# Patient Record
Sex: FEMALE | Race: WHITE | NOT HISPANIC OR LATINO | Employment: FULL TIME | URBAN - METROPOLITAN AREA
[De-identification: names, ages, dates, MRNs, and addresses within clinical notes are randomized per-mention and may not be internally consistent; named-entity substitution may affect disease eponyms.]

---
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
## 2018-02-28 ENCOUNTER — LAB (OUTPATIENT)
Dept: LAB | Facility: HOSPITAL | Age: 18
End: 2018-02-28

## 2018-02-28 ENCOUNTER — TRANSCRIBE ORDERS (OUTPATIENT)
Dept: LAB | Facility: HOSPITAL | Age: 18
End: 2018-02-28

## 2018-02-28 DIAGNOSIS — R53.83 TIRED: ICD-10-CM

## 2018-02-28 DIAGNOSIS — R53.83 TIRED: Primary | ICD-10-CM

## 2018-02-28 LAB — 25(OH)D3 SERPL-MCNC: 24.4 NG/ML

## 2018-02-28 PROCEDURE — 86664 EPSTEIN-BARR NUCLEAR ANTIGEN: CPT

## 2018-02-28 PROCEDURE — 82306 VITAMIN D 25 HYDROXY: CPT

## 2018-02-28 PROCEDURE — 86663 EPSTEIN-BARR ANTIBODY: CPT

## 2018-02-28 PROCEDURE — 36415 COLL VENOUS BLD VENIPUNCTURE: CPT

## 2018-02-28 PROCEDURE — 86665 EPSTEIN-BARR CAPSID VCA: CPT

## 2018-03-01 LAB
EBV EA IGG SER-ACNC: 10.1 U/ML (ref 0–8.9)
EBV NA IGG SER IA-ACNC: 264 U/ML (ref 0–17.9)
EBV VCA IGG SER-ACNC: 174 U/ML (ref 0–17.9)
EBV VCA IGM SER-ACNC: <36 U/ML (ref 0–35.9)
INTERPRETATION: ABNORMAL

## 2018-03-26 ENCOUNTER — TRANSCRIBE ORDERS (OUTPATIENT)
Dept: ADMINISTRATIVE | Facility: HOSPITAL | Age: 18
End: 2018-03-26

## 2018-03-26 ENCOUNTER — HOSPITAL ENCOUNTER (OUTPATIENT)
Dept: GENERAL RADIOLOGY | Facility: HOSPITAL | Age: 18
Discharge: HOME | End: 2018-03-26
Attending: PEDIATRICS | Admitting: PEDIATRICS

## 2018-03-26 DIAGNOSIS — R07.9 CHEST PAIN, UNSPECIFIED TYPE: Primary | ICD-10-CM

## 2018-03-26 PROCEDURE — 71046 X-RAY EXAM CHEST 2 VIEWS: CPT

## 2018-03-28 ENCOUNTER — TRANSCRIBE ORDERS (OUTPATIENT)
Dept: ADMINISTRATIVE | Facility: HOSPITAL | Age: 18
End: 2018-03-28

## 2018-03-28 ENCOUNTER — HOSPITAL ENCOUNTER (OUTPATIENT)
Dept: CT IMAGING | Facility: HOSPITAL | Age: 18
Discharge: HOME | End: 2018-03-28
Admitting: NURSE PRACTITIONER

## 2018-03-28 DIAGNOSIS — R05.9 COUGH: ICD-10-CM

## 2018-03-28 DIAGNOSIS — R07.9 CHEST PAIN, UNSPECIFIED TYPE: ICD-10-CM

## 2018-03-28 DIAGNOSIS — R53.83 FATIGUE, UNSPECIFIED TYPE: ICD-10-CM

## 2018-03-28 DIAGNOSIS — R07.9 CHEST PAIN, UNSPECIFIED TYPE: Primary | ICD-10-CM

## 2018-03-28 PROCEDURE — 71260 CT THORAX DX C+: CPT

## 2018-03-28 PROCEDURE — 25010000002 IOPAMIDOL 61 % SOLUTION: Performed by: NURSE PRACTITIONER

## 2018-03-28 RX ADMIN — IOPAMIDOL 75 ML: 612 INJECTION, SOLUTION INTRAVENOUS at 14:56

## 2018-03-29 ENCOUNTER — TRANSCRIBE ORDERS (OUTPATIENT)
Dept: LAB | Facility: HOSPITAL | Age: 18
End: 2018-03-29

## 2018-03-29 ENCOUNTER — LAB (OUTPATIENT)
Dept: LAB | Facility: HOSPITAL | Age: 18
End: 2018-03-29

## 2018-03-29 DIAGNOSIS — R07.81 PLEURITIC CHEST PAIN: ICD-10-CM

## 2018-03-29 DIAGNOSIS — R06.02 SHORTNESS OF BREATH: ICD-10-CM

## 2018-03-29 DIAGNOSIS — R05.9 COUGH: ICD-10-CM

## 2018-03-29 DIAGNOSIS — J18.9 PNEUMONIA, ORGANISM UNSPECIFIED(486): ICD-10-CM

## 2018-03-29 DIAGNOSIS — R91.8 LUNG MASS: ICD-10-CM

## 2018-03-29 DIAGNOSIS — R91.8 LUNG MASS: Primary | ICD-10-CM

## 2018-03-29 LAB
CRP SERPL-MCNC: 0.63 MG/DL (ref 0–1)
CRYPTOC AG CSF QL: NEGATIVE
ERYTHROCYTE [SEDIMENTATION RATE] IN BLOOD: 32 MM/HR (ref 0–20)

## 2018-03-29 PROCEDURE — 85652 RBC SED RATE AUTOMATED: CPT

## 2018-03-29 PROCEDURE — 36415 COLL VENOUS BLD VENIPUNCTURE: CPT

## 2018-03-29 PROCEDURE — 86140 C-REACTIVE PROTEIN: CPT

## 2018-03-29 PROCEDURE — 87899 AGENT NOS ASSAY W/OPTIC: CPT

## 2018-04-12 ENCOUNTER — TRANSCRIBE ORDERS (OUTPATIENT)
Dept: ADMINISTRATIVE | Facility: HOSPITAL | Age: 18
End: 2018-04-12

## 2018-04-12 DIAGNOSIS — J18.9 PNEUMONIA, ORGANISM UNSPECIFIED(486): Primary | ICD-10-CM

## 2018-05-09 ENCOUNTER — HOSPITAL ENCOUNTER (OUTPATIENT)
Dept: GENERAL RADIOLOGY | Facility: HOSPITAL | Age: 18
Discharge: HOME | End: 2018-05-09
Admitting: NURSE PRACTITIONER

## 2018-05-09 ENCOUNTER — TRANSCRIBE ORDERS (OUTPATIENT)
Dept: ADMINISTRATIVE | Facility: HOSPITAL | Age: 18
End: 2018-05-09

## 2018-05-09 DIAGNOSIS — R05.9 COUGH: Primary | ICD-10-CM

## 2018-05-09 DIAGNOSIS — R50.9 FEVER, UNSPECIFIED FEVER CAUSE: ICD-10-CM

## 2018-05-09 PROCEDURE — 71046 X-RAY EXAM CHEST 2 VIEWS: CPT

## 2018-07-09 ENCOUNTER — OFFICE VISIT (OUTPATIENT)
Dept: ORTHOPEDIC SURGERY | Facility: CLINIC | Age: 18
End: 2018-07-09

## 2018-07-09 VITALS — OXYGEN SATURATION: 98 % | WEIGHT: 126.98 LBS | HEIGHT: 68 IN | HEART RATE: 100 BPM | BODY MASS INDEX: 19.25 KG/M2

## 2018-07-09 DIAGNOSIS — M79.672 LEFT FOOT PAIN: Primary | ICD-10-CM

## 2018-07-09 DIAGNOSIS — S90.32XA CONTUSION OF LEFT FOOT, INITIAL ENCOUNTER: ICD-10-CM

## 2018-07-09 PROCEDURE — 99204 OFFICE O/P NEW MOD 45 MIN: CPT | Performed by: PHYSICIAN ASSISTANT

## 2018-07-09 NOTE — PROGRESS NOTES
[facility] [place] [facility] [place]    Subjective     Chief Complaint   Patient presents with   • Left Foot - Pain, Edema        HPI      Kym Lo is a 17 y.o. female.  Patient presents to orthopedic clinic for evaluation of her left foot.  Stated 1 week ago she hit lateral aspect of the foot with stage.  Since then she's had complained of pain, swelling, bruising.  No prior history of injury or trauma to this foot.  She denies any distal paresthesias or radiculopathy.  Treatment today has included anti-inflammatories.  Current pain scale maximum 7/10.  Severity the pain moderate.  Quality pain aching and throbbing.  Associated symptoms swelling.  Symptoms worse with walking.        History reviewed. No pertinent past medical history.   History reviewed. No pertinent surgical history.   History reviewed. No pertinent family history.  Social History     Social History   • Marital status: Single     Spouse name: N/A   • Number of children: N/A   • Years of education: N/A     Occupational History   • Not on file.     Social History Main Topics   • Smoking status: Never Smoker   • Smokeless tobacco: Never Used   • Alcohol use No   • Drug use: No   • Sexual activity: Defer     Other Topics Concern   • Not on file     Social History Narrative   • No narrative on file      No current outpatient prescriptions on file prior to visit.     No current facility-administered medications on file prior to visit.       No Known Allergies     The following portions of the patient's history were reviewed and updated as appropriate: allergies, current medications, past family history, past medical history, past social history, past surgical history and problem list.    Review of Systems   Constitutional: Negative.    HENT: Negative.    Eyes: Negative.    Respiratory: Negative.    Cardiovascular: Negative.    Gastrointestinal: Negative.    Endocrine: Negative.    Genitourinary: Negative.    Musculoskeletal: Positive for  "arthralgias, gait problem and joint swelling.   Skin: Negative.    Allergic/Immunologic: Negative.    Hematological: Negative.    Psychiatric/Behavioral: Negative.         Objective      Physical Exam  Pulse 100   Ht 172.7 cm (68\")   Wt 57.6 kg (126 lb 15.8 oz)   SpO2 98%   BMI 19.31 kg/m²     Body mass index is 19.31 kg/m².        GENERAL APPEARANCE: awake, alert & oriented x 3, in no acute distress and well developed, well nourished  PSYCH: normal mood and affect  LUNGS:  breathing nonlabored, no wheezing  EYES: sclera anicteric, pupils equal  CARDIOVASCULAR: palpable pulses dorsalis pedis, palpable posterior tibial bilaterally. Capillary refill less than 2 seconds  INTEGUMENTARY: skin intact, no clubbing, cyanosis  NEUROLOGIC:  Normal Sensation and reflexes         Ortho Exam  Peripheral Vascular:  Lower Extremity:  Inspection:  Left--rapid capillary refill  Palpation:  Dorsalis pedis pulse:  Left--normal    Neurologic  Sensory:    Light Touch:     Left foot:  Dorsal intact and plantar intact    Overall Assessment of Muscle Strength and Tone:  Lower Extremities:       Left:  Tibialis anterior--5/5    Gastroc soleus--5/5    EHL--5/5    FHL--5/5    Musculoskeletal  Lower Extremity  Ankle/Foot:  Inspection and Palpation:        Left:  Tenderness: Lateral aspect foot to include fifth metatarsal.    Swelling: Localized swelling lateral aspect of the foot along with ecchymosis noted into the toes, bilateral foot and directly over fifth metatarsal.    Effusion:  None    Crepitus:  None     ROM:     Left: Plantarflexion--50    Dorsiflexion--20    Inversion--5    Eversion--5    Instability:        Left: Anterior drawer test--negative    Squeeze test--negative    Imaging/Studies  Imaging Results (last 7 days)     Procedure Component Value Units Date/Time    XR Foot 3+ View Left [[id number]] Resulted:  07/09/18 1503     Updated:  07/09/18 1504    Narrative:       Left Foot X-Ray  Indication: Pain  AP, Lateral, and Oblique " views    Findings:  No fracture  No bony lesion  Normal soft tissues  Normal joint spaces    No prior studies were available for comparison.          Ordered plain views of the left foot yesterday.  Images were reviewed by Dr. Sherwood.  No acute bony abnormality, fracture or dislocation.  Joint spaces are preserved.  No abnormal soft tissue findings.  See chart for official report.    Assessment/Plan        ICD-10-CM ICD-9-CM   1. Left foot pain M79.672 729.5   2. Contusion of left foot, initial encounter S90.32XA 924.20       Orders Placed This Encounter   Procedures   • XR Foot 3+ View Left        -Discussion was had with the patient regarding exam, imaging, assessment and treatment options.  -At this time we'll place her in a hard sole postop shoe to give her foot support.  -Recommend use of over-the-counter anti-inflammatories to help assist with pain and inflammation control.  -Discussed icing and elevation.  -To avoid any type of running jumping or high impact activities.  -Follow-up in 2 weeks for repeat evaluation to include repeat imaging if continues to be symptomatic.  -Questions and concerns answered.    Medical Decision Making  Management Options : over-the-counter medicine  Data/Risk: radiology tests    Diane Mancuso PA-C  07/10/18  10:03 AM         EMR Dragon/Transcription disclaimer:  Much of this encounter note is an electronic transcription of spoken language to printed text. Electronic transcription of spoken language may permit erroneous, or at times, nonsensical words or phrases to be inadvertently transcribed. Although I have reviewed the note for such errors, some may still exist.

## 2020-01-30 ENCOUNTER — OFFICE VISIT (OUTPATIENT)
Dept: INTERNAL MEDICINE | Facility: CLINIC | Age: 20
End: 2020-01-30

## 2020-01-30 VITALS
OXYGEN SATURATION: 99 % | DIASTOLIC BLOOD PRESSURE: 74 MMHG | BODY MASS INDEX: 21.93 KG/M2 | HEART RATE: 82 BPM | SYSTOLIC BLOOD PRESSURE: 112 MMHG | WEIGHT: 144.2 LBS

## 2020-01-30 DIAGNOSIS — F41.9 ANXIETY AND DEPRESSION: Primary | ICD-10-CM

## 2020-01-30 DIAGNOSIS — F32.A ANXIETY AND DEPRESSION: Primary | ICD-10-CM

## 2020-01-30 PROCEDURE — 99203 OFFICE O/P NEW LOW 30 MIN: CPT | Performed by: INTERNAL MEDICINE

## 2020-01-30 RX ORDER — NORETHINDRONE ACETATE AND ETHINYL ESTRADIOL 1MG-20(21)
1 KIT ORAL DAILY
COMMUNITY
End: 2022-05-20

## 2020-01-30 RX ORDER — SERTRALINE HYDROCHLORIDE 25 MG/1
25 TABLET, FILM COATED ORAL DAILY
Qty: 30 TABLET | Refills: 1 | Status: SHIPPED | OUTPATIENT
Start: 2020-01-30 | End: 2021-07-08

## 2020-01-30 NOTE — PROGRESS NOTES
Subjective   Kym Lo is a 19 y.o. female.   Chief Complaint   Patient presents with   • Establish Care       History of Present Illness   Having anxiety and mood changes. Goes to therapy once to every two to three weeks. Does not want to get out of bed some weeks. Crying spells. Anxious. No suicidal or homicidal ideations. No weight loss or weight gain. Has not taken medications for this before.  Nothing makes it worst.     Past Medical History:   Diagnosis Date   • Anxiety    • Depression    • Ovarian cyst      Past Surgical History:   Procedure Laterality Date   • NASAL POLYP SURGERY       Family History   Problem Relation Age of Onset   • No Known Problems Mother    • No Known Problems Father    • No Known Problems Sister    • No Known Problems Brother    • No Known Problems Daughter    • No Known Problems Son    • No Known Problems Maternal Aunt    • No Known Problems Maternal Uncle    • No Known Problems Paternal Aunt    • No Known Problems Paternal Uncle    • No Known Problems Maternal Grandmother    • No Known Problems Maternal Grandfather    • No Known Problems Paternal Grandmother    • No Known Problems Paternal Grandfather    • No Known Problems Other      Social History     Socioeconomic History   • Marital status: Single     Spouse name: Not on file   • Number of children: Not on file   • Years of education: Not on file   • Highest education level: Not on file   Tobacco Use   • Smoking status: Never Smoker   • Smokeless tobacco: Never Used   Substance and Sexual Activity   • Alcohol use: No   • Drug use: No   • Sexual activity: Not Currently       The following portions of the patient's history were reviewed and updated as appropriate: allergies, current medications, past family history, past medical history, past social history, past surgical history and problem list.    Review of Systems   Constitutional: Negative for activity change, appetite change, chills, diaphoresis, fatigue, fever and  unexpected weight change.   HENT: Negative for congestion, ear discharge, ear pain, mouth sores, nosebleeds, sinus pressure, sneezing and sore throat.    Eyes: Negative for pain, discharge and itching.   Respiratory: Negative for cough, chest tightness, shortness of breath and wheezing.    Cardiovascular: Negative for chest pain, palpitations and leg swelling.   Gastrointestinal: Negative for abdominal pain, constipation, diarrhea, nausea and vomiting.   Endocrine: Negative for cold intolerance, heat intolerance, polydipsia and polyphagia.   Genitourinary: Negative for dysuria, flank pain, frequency, hematuria and urgency.   Musculoskeletal: Negative for arthralgias, back pain, gait problem, myalgias, neck pain and neck stiffness.   Skin: Negative for color change, pallor and rash.   Neurological: Negative for seizures, speech difficulty, numbness and headaches.   Psychiatric/Behavioral: Negative for agitation, confusion, decreased concentration and sleep disturbance. The patient is nervous/anxious.      /74   Pulse 82   Wt 65.4 kg (144 lb 3.2 oz)   SpO2 99%   Breastfeeding No   BMI 21.93 kg/m²     Objective   Physical Exam   Constitutional: She is oriented to person, place, and time. She appears well-developed.   HENT:   Head: Normocephalic.   Right Ear: External ear normal.   Left Ear: External ear normal.   Nose: Nose normal.   Mouth/Throat: Oropharynx is clear and moist.   Eyes: Pupils are equal, round, and reactive to light. Conjunctivae are normal.   Neck: No JVD present. No thyromegaly present.   Cardiovascular: Normal rate, regular rhythm and normal heart sounds. Exam reveals no friction rub.   No murmur heard.  Pulmonary/Chest: Effort normal and breath sounds normal. No respiratory distress. She has no wheezes. She has no rales.   Abdominal: Soft. She exhibits no distension. There is no tenderness. There is no guarding.   Musculoskeletal: She exhibits no edema or tenderness.   Lymphadenopathy:      She has no cervical adenopathy.   Neurological: She is alert and oriented to person, place, and time. She displays normal reflexes. No cranial nerve deficit.   Skin: No rash noted.   Psychiatric: Her behavior is normal.   Nursing note and vitals reviewed.      Assessment/Plan   Kym was seen today for establish care.    Diagnoses and all orders for this visit:    Anxiety and depression  -     sertraline (ZOLOFT) 25 MG tablet; Take 1 tablet by mouth Daily.

## 2020-09-15 PROCEDURE — U0003 INFECTIOUS AGENT DETECTION BY NUCLEIC ACID (DNA OR RNA); SEVERE ACUTE RESPIRATORY SYNDROME CORONAVIRUS 2 (SARS-COV-2) (CORONAVIRUS DISEASE [COVID-19]), AMPLIFIED PROBE TECHNIQUE, MAKING USE OF HIGH THROUGHPUT TECHNOLOGIES AS DESCRIBED BY CMS-2020-01-R: HCPCS | Performed by: NURSE PRACTITIONER

## 2020-09-17 ENCOUNTER — TELEPHONE (OUTPATIENT)
Dept: URGENT CARE | Facility: CLINIC | Age: 20
End: 2020-09-17

## 2020-10-07 ENCOUNTER — OFFICE VISIT (OUTPATIENT)
Dept: OBSTETRICS AND GYNECOLOGY | Facility: CLINIC | Age: 20
End: 2020-10-07

## 2020-10-07 VITALS
SYSTOLIC BLOOD PRESSURE: 110 MMHG | WEIGHT: 148 LBS | HEIGHT: 68 IN | DIASTOLIC BLOOD PRESSURE: 70 MMHG | BODY MASS INDEX: 22.43 KG/M2

## 2020-10-07 DIAGNOSIS — Z30.011 ENCOUNTER FOR INITIAL PRESCRIPTION OF CONTRACEPTIVE PILLS: Primary | ICD-10-CM

## 2020-10-07 PROCEDURE — 99213 OFFICE O/P EST LOW 20 MIN: CPT | Performed by: OBSTETRICS & GYNECOLOGY

## 2020-10-07 RX ORDER — NORETHINDRONE ACETATE AND ETHINYL ESTRADIOL 1; .02 MG/1; MG/1
1 TABLET ORAL DAILY
Qty: 84 TABLET | Refills: 3 | Status: SHIPPED | OUTPATIENT
Start: 2020-10-07 | End: 2021-07-08

## 2020-10-07 NOTE — PROGRESS NOTES
No chief complaint on file.        Subjective   HPI  Kym Lo is a 19 y.o. female, , LMP was on Patient's last menstrual period was 10/06/2020 (exact date). who presents for birth control counseling. She has been on OCP's for a while for ovarian cyst suppression.  She changed from LoLoestrin to Blisolvi approx. 6 mo's ago and has had severe mood changes several days prior and during her periods.  She is not currently sexually active but states she has absolutely no interest either.     Her periods are regular every 28-30 days, lasting 5 days.  Dysmenorrhea:none.  Partner Status: Marital Status: single.  The patient's current method of contraception is contraceptive methods: OCP (estrogen/progesterone).  She is not satisfied with her current method of birth control due to mood swings. The patient reports additional symptoms as bloating and headache.      Partner Status: Marital Status: single.  New Partners since last visit: no.  Desires STD Screening: no.    Additional OB/GYN History   Last Pap :   Last Completed Pap Smear       Status Date      [facility]        History of abnormal Pap smear: no    OB History        0    Para   0    Term   0       0    AB   0    Living   0       SAB   0    TAB   0    Ectopic   0    Molar   0    Multiple   0    Live Births   0                The additional following portions of the patient's history were reviewed and updated as appropriate: allergies, current medications, past family history, past medical history, past social history, past surgical history and problem list.    Review of Systems   Constitutional: Negative.    HENT: Negative.    Eyes: Negative.    Respiratory: Negative.    Cardiovascular: Negative.    Gastrointestinal: Negative.    Endocrine: Negative.    Genitourinary: Negative.    Musculoskeletal: Negative.    Skin: Negative.    Allergic/Immunologic: Negative.    Neurological: Negative.    Hematological:  "Negative.    Psychiatric/Behavioral: Negative.        I have reviewed and agree with the HPI, ROS, and historical information as entered above. Jenifer Barber MD    Objective   /70   Ht 172.7 cm (68\")   Wt 67.1 kg (148 lb)   LMP 10/06/2020 (Exact Date)   Breastfeeding No   BMI 22.50 kg/m²     Physical Exam  Vitals signs and nursing note reviewed.   Constitutional:       General: She is not in acute distress.     Appearance: Normal appearance. She is well-developed.   HENT:      Head: Normocephalic and atraumatic.   Cardiovascular:      Rate and Rhythm: Normal rate.      Heart sounds: No murmur. No friction rub. No gallop.    Pulmonary:      Effort: Pulmonary effort is normal. No respiratory distress or retractions.      Breath sounds: No stridor. No wheezing, rhonchi or rales.   Abdominal:      General: There is no distension.      Palpations: Abdomen is soft. Abdomen is not rigid. There is no mass.      Tenderness: There is no abdominal tenderness. There is no guarding or rebound.      Hernia: No hernia is present.   Neurological:      Mental Status: She is alert and oriented to person, place, and time.   Psychiatric:         Mood and Affect: Mood normal.         Behavior: Behavior normal.         Thought Content: Thought content normal.         Assessment/Plan     Assessment     Problem List Items Addressed This Visit     None      Visit Diagnoses     Encounter for initial prescription of contraceptive pills    -  Primary          1. will go back to lo loestrin, but start continuoous, to help with mood issues. fu for annual and to monitor.       Jenifer Barber MD  10/07/2020  "

## 2021-01-25 ENCOUNTER — HOSPITAL ENCOUNTER (OUTPATIENT)
Dept: GENERAL RADIOLOGY | Facility: HOSPITAL | Age: 21
Discharge: HOME | End: 2021-01-25
Admitting: PEDIATRICS

## 2021-01-25 ENCOUNTER — TRANSCRIBE ORDERS (OUTPATIENT)
Dept: ADMINISTRATIVE | Facility: HOSPITAL | Age: 21
End: 2021-01-25

## 2021-01-25 DIAGNOSIS — R06.02 SHORTNESS OF BREATH: Primary | ICD-10-CM

## 2021-01-25 PROCEDURE — 71046 X-RAY EXAM CHEST 2 VIEWS: CPT

## 2021-07-08 ENCOUNTER — OFFICE VISIT (OUTPATIENT)
Dept: OBSTETRICS AND GYNECOLOGY | Facility: CLINIC | Age: 21
End: 2021-07-08

## 2021-07-08 VITALS
BODY MASS INDEX: 22.73 KG/M2 | SYSTOLIC BLOOD PRESSURE: 120 MMHG | HEIGHT: 68 IN | DIASTOLIC BLOOD PRESSURE: 70 MMHG | WEIGHT: 150 LBS

## 2021-07-08 DIAGNOSIS — Z01.419 ROUTINE GYNECOLOGICAL EXAMINATION: Primary | ICD-10-CM

## 2021-07-08 PROCEDURE — 99395 PREV VISIT EST AGE 18-39: CPT | Performed by: NURSE PRACTITIONER

## 2021-07-08 NOTE — PROGRESS NOTES
GYN Annual Exam     CC - Here for annual exam.        HPI  Kym Lo is a 20 y.o. female, , who presents for annual well woman exam. No LMP recorded (lmp unknown)..  Periods are absent on continuous OCP.  Dysmenorrhea:mild, occurring premenstrually and first 1-2 days of flow.  Patient reports problems with: none.  There were no changes to her medical or surgical history since her last visit.. Partner Status: Marital Status: single.  She is not currently sexually active. Has not been sexually active since her last visit  And would prefer not to have a speculum exam. A bi-manual exam is fine.    Additional OB/GYN History   Current contraception: contraceptive methods: OCP (estrogen/progesterone)  Desires to: change to something without weight gain contraception  Last Pap : 2020 negative   Last Completed Pap Smear          PAP SMEAR (Every 3 Years) Next due on 2020  Done - in [place] (negative; CT/NG negative)              History of abnormal Pap smear: no  Family history of uterine, colon, breast, or ovarian cancer: no  Performs monthly Self-Breast Exam: yes  Exercises Regularly:yes  Feelings of Anxiety or Depression: yes - not treated  Tobacco Usage?: No   OB History        0    Para   0    Term   0       0    AB   0    Living   0       SAB   0    TAB   0    Ectopic   0    Molar   0    Multiple   0    Live Births   0                Health Maintenance   Topic Date Due   • Annual Gynecologic Pelvic and Breast Exam  Never done   • ANNUAL PHYSICAL  Never done   • HPV VACCINES (1 - 2-dose series) Never done   • COVID-19 Vaccine (1) Never done   • TDAP/TD VACCINES (1 - Tdap) Never done   • HEPATITIS C SCREENING  Never done   • CHLAMYDIA SCREENING  Never done   • INFLUENZA VACCINE  2021   • PAP SMEAR  2023   • Pneumococcal Vaccine 0-64  Aged Out   • MENINGOCOCCAL VACCINE  Aged Out       The additional following portions of the patient's history were reviewed  "and updated as appropriate: allergies, current medications, past family history, past medical history, past social history, past surgical history and problem list.    Review of Systems   Constitutional: Negative.    Cardiovascular: Negative.    Gastrointestinal: Negative.    Genitourinary: Negative.    Psychiatric/Behavioral: Negative.    All other systems reviewed and are negative.        I have reviewed and agree with the HPI, ROS, and historical information as entered above. Ludy Lund, APRN    Objective   /70   Ht 172.7 cm (68\")   Wt 68 kg (150 lb)   LMP  (LMP Unknown)   BMI 22.81 kg/m²     Physical Exam  Vitals and nursing note reviewed. Exam conducted with a chaperone present.   Constitutional:       Appearance: Normal appearance. She is normal weight.   Cardiovascular:      Rate and Rhythm: Normal rate and regular rhythm.   Chest:      Breasts:         Right: Normal.         Left: Normal.   Abdominal:      Palpations: Abdomen is soft.   Genitourinary:     General: Normal vulva.      Vagina: Normal.      Cervix: Normal.      Uterus: Normal.       Adnexa: Right adnexa normal and left adnexa normal.      Rectum: Normal.   Neurological:      Mental Status: She is alert.            Assessment and Plan    Problem List Items Addressed This Visit     None          1. GYN annual well woman exam.   2. She states has taken loestrin 1/20 continuously due to h/o mood swings. She is going to try 2 months of being totally off ocps to see how moods are without hormonal contraception and then will start 2 sample packs of lo loestrin and take placebo pills. She plans to become sexually active after marriage later this year.   3. Reviewed monthly self breast exams.  Instructed to call with lumps, pain, or breast discharge.    4. Reviewed exercise as a preventative health measures.   5. Reccommended Flu Vaccine in Fall of each year.  6. RTC in 1 year or PRN with problems      SENDY Garcia  07/08/2021  "

## 2021-12-21 PROCEDURE — 87081 CULTURE SCREEN ONLY: CPT | Performed by: NURSE PRACTITIONER

## 2021-12-24 ENCOUNTER — TELEPHONE (OUTPATIENT)
Dept: URGENT CARE | Facility: CLINIC | Age: 21
End: 2021-12-24

## 2022-05-20 ENCOUNTER — OFFICE VISIT (OUTPATIENT)
Dept: INTERNAL MEDICINE | Facility: CLINIC | Age: 22
End: 2022-05-20

## 2022-05-20 VITALS
TEMPERATURE: 98.6 F | BODY MASS INDEX: 23.04 KG/M2 | HEART RATE: 94 BPM | HEIGHT: 68 IN | OXYGEN SATURATION: 99 % | RESPIRATION RATE: 16 BRPM | SYSTOLIC BLOOD PRESSURE: 110 MMHG | DIASTOLIC BLOOD PRESSURE: 68 MMHG | WEIGHT: 152 LBS

## 2022-05-20 DIAGNOSIS — J40 BRONCHITIS: ICD-10-CM

## 2022-05-20 DIAGNOSIS — R05.9 COUGH: Primary | ICD-10-CM

## 2022-05-20 LAB
EXPIRATION DATE: NORMAL
FLUAV AG UPPER RESP QL IA.RAPID: NOT DETECTED
FLUBV AG UPPER RESP QL IA.RAPID: NOT DETECTED
INTERNAL CONTROL: NORMAL
Lab: NORMAL
SARS-COV-2 AG UPPER RESP QL IA.RAPID: NOT DETECTED

## 2022-05-20 PROCEDURE — 87428 SARSCOV & INF VIR A&B AG IA: CPT

## 2022-05-20 PROCEDURE — 99213 OFFICE O/P EST LOW 20 MIN: CPT

## 2022-05-20 RX ORDER — AZITHROMYCIN 250 MG/1
TABLET, FILM COATED ORAL
Qty: 6 TABLET | Refills: 0 | Status: SHIPPED | OUTPATIENT
Start: 2022-05-20 | End: 2022-06-07

## 2022-05-20 RX ORDER — SPIRONOLACTONE 50 MG/1
TABLET, FILM COATED ORAL
COMMUNITY
Start: 2022-02-24 | End: 2022-07-07

## 2022-05-20 RX ORDER — DUPILUMAB 300 MG/2ML
INJECTION, SOLUTION SUBCUTANEOUS
COMMUNITY
Start: 2022-04-14 | End: 2023-03-13

## 2022-05-20 NOTE — PROGRESS NOTES
Chief Complaint  Cough and Nasal Congestion (Pt states sxs x 2 weeks, negative strep test)    Kym Lo presents to [facility]    HPI: Nasal congestion, sore throat, and cough x 2 weeks. Cough is dry and non-productive. Took an at home COVID test which was negative. Has tried OTC cough and cold medications with some relief in symptoms. Pain is rated as a 0 out of 10.     Subjective         Health Maintenance   Topic   • ANNUAL PHYSICAL    • COVID-19 Vaccine (1)   • HPV VACCINES (1 - 2-dose series)   • TDAP/TD VACCINES (1 - Tdap)   • HEPATITIS C SCREENING    • CHLAMYDIA SCREENING    • INFLUENZA VACCINE    • PAP SMEAR    • Pneumococcal Vaccine 0-64        PMSFH  The following portions of the patient's history were reviewed and updated as appropriate: allergies, current medications, past family history, past medical history, past social history, past surgical history and problem list.     Past Medical History:   Diagnosis Date   • Anxiety    • Ovarian cyst       No Known Allergies   Social History     Tobacco Use   • Smoking status: Never Smoker   • Smokeless tobacco: Never Used   Vaping Use   • Vaping Use: Never used   Substance Use Topics   • Alcohol use: Yes     Comment: rarely   • Drug use: No     Past Surgical History:   Procedure Laterality Date   • NASAL POLYP SURGERY     • SINUS SURGERY  2016   • WISDOM TOOTH EXTRACTION        Family History   Problem Relation Age of Onset   • No Known Problems Mother    • No Known Problems Father    • No Known Problems Sister    • No Known Problems Brother    • No Known Problems Daughter    • No Known Problems Son    • No Known Problems Maternal Aunt    • No Known Problems Maternal Uncle    • No Known Problems Paternal Aunt    • No Known Problems Paternal Uncle    • No Known Problems Maternal Grandmother    • No Known Problems Maternal Grandfather    • No Known Problems Paternal Grandmother    • No Known Problems Paternal Grandfather   "  • No Known Problems Other          Current Outpatient Medications:   •  Dupixent 300 MG/2ML solution prefilled syringe, , Disp: , Rfl:   •  spironolactone (ALDACTONE) 50 MG tablet, , Disp: , Rfl:   •  azithromycin (Zithromax Z-Jadon) 250 MG tablet, Take 2 tablets by mouth on day 1, then 1 tablet daily on days 2-5, Disp: 6 tablet, Rfl: 0    Review of Systems   Constitutional: Negative for activity change, appetite change, chills, fatigue and fever.   HENT: Positive for congestion, postnasal drip, rhinorrhea and sore throat. Negative for dental problem, drooling, ear discharge, ear pain, facial swelling, hearing loss, mouth sores, nosebleeds, sinus pressure, sneezing, swollen glands, tinnitus, trouble swallowing and voice change.    Respiratory: Positive for cough. Negative for apnea, choking, chest tightness, shortness of breath, wheezing and stridor.    Cardiovascular: Negative for chest pain, palpitations and leg swelling.   Allergic/Immunologic: Negative for environmental allergies and food allergies.       Objective   Vital Signs  /68   Pulse 94   Temp 98.6 °F (37 °C)   Resp 16   Ht 172.7 cm (68\")   Wt 68.9 kg (152 lb)   SpO2 99%   BMI 23.11 kg/m²     Physical Exam  Constitutional:       Appearance: Normal appearance.   HENT:      Head: Normocephalic.      Right Ear: Hearing, tympanic membrane, ear canal and external ear normal.      Left Ear: Hearing, tympanic membrane, ear canal and external ear normal.      Nose: Congestion and rhinorrhea present. Rhinorrhea is clear.      Right Turbinates: Enlarged and swollen. Not pale.      Left Turbinates: Enlarged and swollen. Not pale.      Right Sinus: No frontal sinus tenderness.      Left Sinus: No maxillary sinus tenderness or frontal sinus tenderness.      Mouth/Throat:      Mouth: Mucous membranes are moist.      Pharynx: Oropharynx is clear. Posterior oropharyngeal erythema present. No pharyngeal swelling.   Eyes:      Conjunctiva/sclera: Conjunctivae " normal.      Pupils: Pupils are equal, round, and reactive to light.   Cardiovascular:      Rate and Rhythm: Normal rate and regular rhythm.      Pulses: Normal pulses.      Heart sounds: Normal heart sounds.   Pulmonary:      Effort: Pulmonary effort is normal.      Breath sounds: Normal breath sounds.   Skin:     General: Skin is warm and dry.      Capillary Refill: Capillary refill takes less than 2 seconds.   Neurological:      Mental Status: She is alert and oriented to person, place, and time.   Psychiatric:         Mood and Affect: Mood normal.         Behavior: Behavior normal.         Thought Content: Thought content normal.         Judgment: Judgment normal.          Result Review :     The following data was reviewed by: SENDY Jaime on 05/20/2022:    Flu and COVID negative.     Assessment and Plan    1. Cough  - POCT SARS-CoV-2 Antigen VALENTINO + Flu    2. Bronchitis  - azithromycin (Zithromax Z-Jadon) 250 MG tablet; Take 2 tablets by mouth on day 1, then 1 tablet daily on days 2-5  Dispense: 6 tablet; Refill: 0  - Advised in symptomatic relief with recommended OTC medications or prescribed medications this visit. Encouraged rest and hydration. Salt water gargles/chloraseptic spray for sore throat prn. Advised in hygiene measures and time frame of 7-10 days for resolution of symptoms. Follow-up with PCP for new, worsening, or persistent symptoms.    Follow Up     Return for Next scheduled follow up.    Patient was given instructions and counseling regarding her condition or for health maintenance advice. Please see specific information pulled into the AVS if appropriate.    Part of this note may be an electronic transcription/translation of spoken language to printed text using the Dragon Dictation System.    Electronically signed by:   SENDY Jaime  05/20/2022

## 2022-06-07 ENCOUNTER — OFFICE VISIT (OUTPATIENT)
Dept: OBSTETRICS AND GYNECOLOGY | Facility: CLINIC | Age: 22
End: 2022-06-07

## 2022-06-07 VITALS
SYSTOLIC BLOOD PRESSURE: 124 MMHG | HEIGHT: 68 IN | WEIGHT: 158.9 LBS | DIASTOLIC BLOOD PRESSURE: 62 MMHG | BODY MASS INDEX: 24.08 KG/M2

## 2022-06-07 DIAGNOSIS — N94.6 DYSMENORRHEA: ICD-10-CM

## 2022-06-07 DIAGNOSIS — Z01.419 WOMEN'S ANNUAL ROUTINE GYNECOLOGICAL EXAMINATION: Primary | ICD-10-CM

## 2022-06-07 DIAGNOSIS — Z87.42 HX OF OVARIAN CYST: ICD-10-CM

## 2022-06-07 DIAGNOSIS — N92.0 MENORRHAGIA WITH REGULAR CYCLE: ICD-10-CM

## 2022-06-07 PROCEDURE — 99395 PREV VISIT EST AGE 18-39: CPT | Performed by: NURSE PRACTITIONER

## 2022-06-07 NOTE — PROGRESS NOTES
GYN Annual Exam     CC - Here for annual exam.     Subjective   HPI  Kym Lo is a 21 y.o. female, , who presents for annual well woman exam. Patient's last menstrual period was 2021 (approximate)..  Periods are regular every 25-35 days, lasting 6 days. The patient uses 1 of tampons/pads per hour..  Dysmenorrhea:moderate, occurring premenstrually and first 1-2 days of flow.  Patient reports problems with: has h/o ovarian cysts. .She reports significant pain in lower pelvic region that comes and goes on both sides of abdomen. When she gets this pain she takes a weeks worth of birth control pills and the pain goes away.  Partner Status: Marital Status: .  New Partners since last visit: no.  Desires STD Screening: no. She is desiring to conceive in the next year or so and is worried she has PCOS. She reports acne, blood sugar problems, states she gets dizzy and shaky wether she eats or not, mood changes, weight gain.     Additional OB/GYN History   Current contraception: contraceptive methods: Condoms  Desires to: do not start contraception  Last Pap :   Last Completed Pap Smear          Ordered - PAP SMEAR (Every 3 Years) Ordered on 2020  Done - in [place] (negative; CT/NG negative)              History of abnormal Pap smear: no  Family history of uterine, colon, breast, or ovarian cancer: no  Performs monthly Self-Breast Exam: yes  Exercises Regularly:yes  Feelings of Anxiety or Depression: yes - anxiety   Tobacco Usage?: No   OB History        0    Para   0    Term   0       0    AB   0    Living   0       SAB   0    IAB   0    Ectopic   0    Molar   0    Multiple   0    Live Births   0                Health Maintenance   Topic Date Due   • ANNUAL PHYSICAL  Never done   • COVID-19 Vaccine (1) Never done   • HPV VACCINES (1 - 2-dose series) Never done   • TDAP/TD VACCINES (1 - Tdap) Never done   • HEPATITIS C SCREENING  Never done   • Annual Gynecologic  "Pelvic and Breast Exam  07/09/2022   • INFLUENZA VACCINE  08/01/2022   • PAP SMEAR  01/14/2023   • CHLAMYDIA SCREENING  06/07/2023   • Pneumococcal Vaccine 0-64  Aged Out       The additional following portions of the patient's history were reviewed and updated as appropriate: allergies, current medications, past family history, past medical history, past social history and past surgical history.    Review of Systems   Constitutional: Negative.    Cardiovascular: Negative.    Gastrointestinal: Negative.    Genitourinary: Positive for pelvic pain (occasional, bilateral, severe).   Psychiatric/Behavioral: Negative.        I have reviewed and agree with the HPI, ROS, and historical information as entered above. Julien Hilton, APRN    Objective   /62 (BP Location: Right arm, Patient Position: Sitting, Cuff Size: Adult)   Ht 172.7 cm (67.99\")   Wt 72.1 kg (158 lb 14.4 oz)   LMP 05/21/2021 (Approximate)   Breastfeeding No   BMI 24.17 kg/m²     Physical Exam  Vitals and nursing note reviewed. Exam conducted with a chaperone present.   Constitutional:       Appearance: Normal appearance. She is well-developed and normal weight.   HENT:      Head: Normocephalic and atraumatic.   Neck:      Thyroid: No thyroid mass or thyromegaly.   Cardiovascular:      Rate and Rhythm: Normal rate and regular rhythm.      Heart sounds: No murmur heard.  Pulmonary:      Effort: Pulmonary effort is normal. No retractions.      Breath sounds: Normal breath sounds. No wheezing, rhonchi or rales.   Chest:      Chest wall: No mass or tenderness.   Breasts:      Right: Normal. No mass, nipple discharge, skin change or tenderness.      Left: Normal. No mass, nipple discharge, skin change or tenderness.       Abdominal:      Palpations: Abdomen is soft. Abdomen is not rigid. There is no mass.      Tenderness: There is no abdominal tenderness. There is no guarding.      Hernia: No hernia is present.   Genitourinary:     General: " Normal vulva.      Labia:         Right: No rash, tenderness or lesion.         Left: No rash, tenderness or lesion.       Vagina: Normal. No vaginal discharge or lesions.      Cervix: Cervical bleeding present. No cervical motion tenderness, discharge or lesion.      Uterus: Normal. Tender. Not enlarged and not fixed.       Adnexa:         Right: Tenderness present. No mass or fullness.          Left: Tenderness present. No mass or fullness.        Rectum: Normal. No external hemorrhoid.   Musculoskeletal:      Cervical back: Normal range of motion. No muscular tenderness.   Neurological:      Mental Status: She is alert and oriented to person, place, and time.   Psychiatric:         Behavior: Behavior normal.         Assessment & Plan     Assessment     Problem List Items Addressed This Visit    None     Visit Diagnoses     Women's annual routine gynecological examination    -  Primary    Relevant Orders    LIQUID-BASED PAP SMEAR, [facility] ([facility])    Hx of ovarian cyst        Relevant Orders    CBC (No Diff)    DHEA-Sulfate    Follicle Stimulating Hormone    Luteinizing Hormone    Testosterone - total    TSH+Free T4    US Non-ob Transvaginal    Dysmenorrhea        Relevant Orders    US Non-ob Transvaginal    Menorrhagia with regular cycle        Relevant Orders    US Non-ob Transvaginal          1. GYN annual well woman exam.     Plan     1. Reviewed monthly self breast exams.  Instructed to call with lumps, pain, or breast discharge.    2. Reccommended Flu Vaccine in Fall of each year.  3. RTC in 1 year or PRN with problems   4. Discouraged use of BC pill as a form of pain relief. Risks discussed. Advised ibuprofen 600 mg Q 6 hrs prn pain.  5. Patient requesting PCOS labs today.   6. Schedule appt. for US.      Julien Hilton, APRN  06/07/2022

## 2022-06-08 LAB
DHEA-S SERPL-MCNC: 333 UG/DL (ref 110–431.7)
ERYTHROCYTE [DISTWIDTH] IN BLOOD BY AUTOMATED COUNT: 12 % (ref 12.3–15.4)
FSH SERPL-ACNC: 3.7 MIU/ML
HCT VFR BLD AUTO: 36.8 % (ref 34–46.6)
HGB BLD-MCNC: 12.2 G/DL (ref 12–15.9)
LH SERPL-ACNC: 7 MIU/ML
MCH RBC QN AUTO: 30.4 PG (ref 26.6–33)
MCHC RBC AUTO-ENTMCNC: 33.2 G/DL (ref 31.5–35.7)
MCV RBC AUTO: 91.8 FL (ref 79–97)
PLATELET # BLD AUTO: 243 10*3/MM3 (ref 140–450)
RBC # BLD AUTO: 4.01 10*6/MM3 (ref 3.77–5.28)
T4 FREE SERPL-MCNC: 1.1 NG/DL (ref 0.93–1.7)
TESTOST SERPL-MCNC: 36 NG/DL (ref 13–71)
TSH SERPL DL<=0.005 MIU/L-ACNC: 1.13 UIU/ML (ref 0.27–4.2)
WBC # BLD AUTO: 8.96 10*3/MM3 (ref 3.4–10.8)

## 2022-06-09 LAB — REF LAB TEST METHOD: NORMAL

## 2022-07-01 ENCOUNTER — TELEPHONE (OUTPATIENT)
Dept: OBSTETRICS AND GYNECOLOGY | Facility: CLINIC | Age: 22
End: 2022-07-01

## 2022-07-01 NOTE — TELEPHONE ENCOUNTER
Patient is c/o a menstrual cycle that has lasted 2 extra days this month. Normally cycles are 5 days. She is on day 7. Cycle is very light. Still has to wear a panty liner. No odor, discharge, pain, Not on birthcontrol. No concern for STD. Sexually active but uses condoms every time. Took a UPT yesterday and was neg. Pap 6/7 neg with neg STDs. Has US scheduled 7/7/22. Patient advised this could be a one time situation. Continue to monitor. Keep scheduled appt 7/7/22. Instructed... If you have any other questions or concerns before then, feel free to contact us.

## 2022-07-01 NOTE — TELEPHONE ENCOUNTER
Pt called. She had pap done last week and she had been on her cycle longer than normal.    She would like to go over pap results.

## 2022-07-07 ENCOUNTER — OFFICE VISIT (OUTPATIENT)
Dept: OBSTETRICS AND GYNECOLOGY | Facility: CLINIC | Age: 22
End: 2022-07-07

## 2022-07-07 VITALS
BODY MASS INDEX: 24.04 KG/M2 | SYSTOLIC BLOOD PRESSURE: 110 MMHG | DIASTOLIC BLOOD PRESSURE: 70 MMHG | HEIGHT: 68 IN | WEIGHT: 158.6 LBS

## 2022-07-07 DIAGNOSIS — Z87.42 HX OF OVARIAN CYST: ICD-10-CM

## 2022-07-07 DIAGNOSIS — N94.6 DYSMENORRHEA: ICD-10-CM

## 2022-07-07 DIAGNOSIS — N92.0 MENORRHAGIA WITH REGULAR CYCLE: Primary | ICD-10-CM

## 2022-07-07 PROCEDURE — 76830 TRANSVAGINAL US NON-OB: CPT | Performed by: OBSTETRICS & GYNECOLOGY

## 2022-07-07 PROCEDURE — 99213 OFFICE O/P EST LOW 20 MIN: CPT | Performed by: OBSTETRICS & GYNECOLOGY

## 2022-07-07 NOTE — PROGRESS NOTES
Chief Complaint   Patient presents with   • Follow-up     AUB, menorrhagia        Subjective   HPI  Kym Lo is a 21 y.o. female, , who presents for ultrasound for AUB and menorrhagia.      She changes a super tampon q 4 hours 1-2 days of her period.  Her last period lasted 2 weeks.     Her last LMP was Patient's last menstrual period was 2022 (exact date)..  Periods are regular every 28-30 days, lasting 6 days.  Dysmenorrhea:moderate, occurring first 1-2 days of flow.   Partner Status: Marital Status: .      Pt is concerned she has PCOS and wants to have children in the future.  She c/o bloating, acne, and fatigue.  Her mother has a h/o endometriosis as well.  Labs on  were wnl.  U/s today reveals EMC of 7.4mm, and follicle on (L) ovary anastacio. 18.7mm.     Additional OB/GYN History   Current contraception: contraceptive methods: Condoms  Desires to: do not start contraception  Last Pap : nml with neg. STD testing.   Last Completed Pap Smear          PAP SMEAR (Every 3 Years) Next due on 2022  LIQUID-BASED PAP SMEAR, [facility] ([facility])    2020  Done - in [place] (negative; CT/NG negative)                Tobacco Usage?: No   OB History        0    Para   0    Term   0       0    AB   0    Living   0       SAB   0    IAB   0    Ectopic   0    Molar   0    Multiple   0    Live Births   0                Health Maintenance   Topic Date Due   • COVID-19 Vaccine (1) Never done   • ANNUAL PHYSICAL  Never done   • HPV VACCINES (1 - 2-dose series) Never done   • TDAP/TD VACCINES (1 - Tdap) Never done   • HEPATITIS C SCREENING  Never done   • INFLUENZA VACCINE  10/01/2022   • CHLAMYDIA SCREENING  2023   • Annual Gynecologic Pelvic and Breast Exam  2023   • PAP SMEAR  2025   • Pneumococcal Vaccine 0-64  Aged Out       The additional following portions of the patient's history were reviewed and updated as appropriate: allergies, current  "medications, past family history, past medical history, past social history, past surgical history and problem list.    Review of Systems   Constitutional: Negative for fever, unexpected weight gain and unexpected weight loss.   Eyes: Negative for visual disturbance.   Respiratory: Negative for cough and shortness of breath.    Cardiovascular: Negative for chest pain.   Gastrointestinal: Negative for abdominal pain, blood in stool, constipation and diarrhea.   Endocrine: Negative for cold intolerance and heat intolerance.   Genitourinary: Negative for amenorrhea, breast discharge, breast lump, dyspareunia, menstrual problem and vaginal pain.   Musculoskeletal: Negative for back pain and myalgias.   Skin: Negative for dry skin and skin lesions.   Neurological: Negative for headache.   Psychiatric/Behavioral: Negative for suicidal ideas and depressed mood.       I have reviewed and agree with the HPI, ROS, and historical information as entered above. Jenifer Barber MD    Objective   /70   Ht 172.7 cm (68\")   Wt 71.9 kg (158 lb 9.6 oz)   LMP 06/23/2022 (Exact Date)   Breastfeeding No   BMI 24.12 kg/m²     Physical Exam  Vitals and nursing note reviewed.   Constitutional:       Appearance: Normal appearance.   HENT:      Head: Normocephalic and atraumatic.   Eyes:      General: No scleral icterus.     Pupils: Pupils are equal, round, and reactive to light.   Pulmonary:      Effort: Pulmonary effort is normal.      Breath sounds: Normal breath sounds.   Abdominal:      General: Bowel sounds are normal. There is no distension.      Palpations: Abdomen is soft.      Tenderness: There is no abdominal tenderness.   Musculoskeletal:         General: Normal range of motion.      Cervical back: Normal range of motion and neck supple.      Right lower leg: No edema.      Left lower leg: No edema.   Skin:     General: Skin is warm and dry.   Neurological:      General: No focal deficit present.      Mental Status: She " is alert and oriented to person, place, and time.   Psychiatric:         Mood and Affect: Mood normal.         Behavior: Behavior normal. Behavior is cooperative.         Assessment & Plan     Assessment     Problem List Items Addressed This Visit    None     Visit Diagnoses     Menorrhagia with regular cycle    -  Primary    Relevant Orders    US Non-ob Transvaginal    Hx of ovarian cyst        Dysmenorrhea              1. Normal US today and recent labs normal. Her periods have overall been regular, but with recent irreg cycle. Discussed with nl zeb can continue to monitor. No evidence of PCOS today, which she was concerned about. Reassurance given.     Plan     No follow-ups on file.  I spent 20 minutes caring for Kym on this date of service. This time includes time spent by me in the following activities:preparing for the visit, reviewing tests, obtaining and/or reviewing a separately obtained history and documenting information in the medical record        Jenifer Barber MD  07/07/2022

## 2023-03-13 ENCOUNTER — INITIAL PRENATAL (OUTPATIENT)
Dept: OBSTETRICS AND GYNECOLOGY | Facility: CLINIC | Age: 23
End: 2023-03-13
Payer: COMMERCIAL

## 2023-03-13 VITALS — SYSTOLIC BLOOD PRESSURE: 118 MMHG | DIASTOLIC BLOOD PRESSURE: 76 MMHG | WEIGHT: 154.4 LBS | BODY MASS INDEX: 23.48 KG/M2

## 2023-03-13 DIAGNOSIS — Z34.91 PRENATAL CARE IN FIRST TRIMESTER: Primary | ICD-10-CM

## 2023-03-13 PROCEDURE — 0501F PRENATAL FLOW SHEET: CPT

## 2023-03-13 RX ORDER — FLUTICASONE PROPIONATE 50 MCG
SPRAY, SUSPENSION (ML) NASAL
COMMUNITY
Start: 2023-02-13

## 2023-03-13 RX ORDER — ALBUTEROL SULFATE 90 UG/1
AEROSOL, METERED RESPIRATORY (INHALATION)
COMMUNITY
Start: 2023-02-13

## 2023-03-13 RX ORDER — PRENATAL VIT/IRON FUM/FOLIC AC 27MG-0.8MG
TABLET ORAL DAILY
COMMUNITY

## 2023-03-13 RX ORDER — PROMETHAZINE HYDROCHLORIDE 12.5 MG/1
TABLET ORAL
Qty: 20 TABLET | Refills: 0 | Status: SHIPPED | OUTPATIENT
Start: 2023-03-13

## 2023-03-13 NOTE — PROGRESS NOTES
Initial ob visit     CC- Here for care of pregnancy        Kym Lo is a 22 y.o. female, , who presents for her first obstetrical visit.  Her last LMP was Patient's last menstrual period was 2023.. Her ALEX is 11/3/2023, by Last Menstrual Period. Current GA is 6w3d.     Initial positive test date : 2023, UPT        Her periods are: every 4 weeks  Prior obstetric issues: none  Patient's past medical history is significant for: Denies.  Family history of genetic issues (includes FOB): Paternal Aunt (down syndrome)  Prior infections concerning in pregnancy (Rash, fever in last 2 weeks): No  Varicella Hx - unknown   Prior testing for Cystic Fibrosis Carrier or Sickle Cell Trait- Denies  Prepregnancy BMI - Body mass index is 23.48 kg/m².  History of STD: no  Hx of HSV for patient or partner: no  Ultrasound Today: yes    OB History    Para Term  AB Living   1 0 0 0 0 0   SAB IAB Ectopic Molar Multiple Live Births   0 0 0 0 0 0      # Outcome Date GA Lbr [place]/2nd Weight Sex Delivery Anes PTL Lv   1 Current                Additional Pertinent History   Last Pap :  Result: negative HPV: not done     Last Completed Pap Smear          PAP SMEAR (Every 3 Years) Next due on 2022  LIQUID-BASED PAP SMEAR, [facility] ([facility])    2020  Done - in [place] (negative; CT/NG negative)              History of abnormal Pap smear: no  Family history of uterine, colon, breast, or ovarian cancer: no  Feelings of Anxiety or Depression: yes - Anxiety  Tobacco Usage?: No   Alcohol/Drug Use?: NO  Over the age of 35 at delivery: no  Desires Genetic Screening: Cell Free DNA  Flu Status: Declines    PMH    Current Outpatient Medications:   •  albuterol sulfate  (90 Base) MCG/ACT inhaler, , Disp: , Rfl:   •  fluticasone (FLONASE) 50 MCG/ACT nasal spray, , Disp: , Rfl:   •  Prenatal Vit-Fe Fumarate-FA (prenatal vitamin 27-0.8) 27-0.8 MG tablet tablet, Take  by mouth  Daily., Disp: , Rfl:   •  promethazine (PHENERGAN) 12.5 MG tablet, Take 1/2 to 1 tablet po q 6 hours prn n/v, Disp: 20 tablet, Rfl: 0     Past Medical History:   Diagnosis Date   • Anxiety    • Asthma    • Eczema    • Ovarian cyst         Past Surgical History:   Procedure Laterality Date   • NASAL POLYP SURGERY     • SINUS SURGERY  2016   • WISDOM TOOTH EXTRACTION         Review of Systems   Review of Systems  Patient Reports: Nausea, Cramping and Fatigue  Patient Denies: Spotting and Heavy bleeding  All systems reviewed and otherwise normal.    I have reviewed and agree with the HPI, ROS, and historical information as entered above. Sofia PATEL Humberto, APRN    /76   Wt 70 kg (154 lb 6.4 oz)   LMP 01/27/2023   BMI 23.48 kg/m²     The additional following portions of the patient's history were reviewed and updated as appropriate: allergies, current medications, past family history, past medical history, past social history, past surgical history and problem list.    Physical Exam  General:  well developed; well nourished  no acute distress   Chest/Respiratory: No labored breathing, normal respiratory effort, normal appearance, no respiratory noises noted   Heart:  normal rate, regular rhythm,  no murmurs, rubs, or gallops   Thyroid: normal to inspection and palpation   Breasts:  Not performed.   Abdomen: soft, non-tender; no masses  no umbilical or inguinal hernias are present  no hepato-splenomegaly   Pelvis: Not performed.        Assessment and Plan    Problem List Items Addressed This Visit    None  Visit Diagnoses     Prenatal care in first trimester    -  Primary    Relevant Medications    promethazine (PHENERGAN) 12.5 MG tablet    Other Relevant Orders    Obstetric Panel    Chlamydia trachomatis, Neisseria gonorrhoeae, PCR w/ confirmation - Urine, Urine, Clean Catch    HIV-1 / O / 2 Ag / Antibody 4th Generation    Urinalysis With Microscopic - Urine, Clean Catch    Urine Culture - Urine, Urine, Clean  Catch    Urine Drug Screen - Urine, Clean Catch    [id number] PLUS Core+SCA+ESS - Blood,          1. Pregnancy at 6w3d  2. US showed GA 6w1d, ALEX 11/5/2023, CRL 3.9mm/6w1d/<1%. LMP GA 6w3d, ALEX 11/3/2023  3. Reviewed routine prenatal care with the office and educational materials given  4. Lab(s) Ordered  5. Discussed options for genetic testing including first trimester nuchal translucency screen, genetic disease carrier testing, quadruple screen, and NIPT.   6. Medication(s) Ordered  7. Discontinue the use of all non-medicinal drugs and chemicals  8. Patient denies Nausea/Vomiting. She would like a prescription sent to pharmacy for nausea. She is going on a cruise 4/2-4/9 and wants something incase she gets nauseous. Discussed conservative ways to help with nausea. She was advised to try conservative management and Unisom w/ B6 first.  If nausea does not improve, she can take phenergan as needed. Advised against taking Zofran in 1st trimester.   9. Patient is on Prenatal vitamins  10. Activity recommendation : 150 minutes/week of moderate intensity aerobic activity unless we limit for bleeding, hypertension or other pregnancy complication   11. Recommend limiting weight gain to 15 to 20 pounds in pregnancy.   12. Discussed carbohydrate control.   13. We discussed anxiety and interventions. Patient states exercise helps. Therapy has helped in the past. She will continue using exercise to manage anxiety now and notify us if she needs therapy and/or medication.    Plan:  1. Patient will come in for [id number] blood draw after trip. (Going on a [place] from 4/2/2023-4/9/2023)  2. Phenergan sent to pharmacy.   3. Continue exercising to control anxiety. Notify provider if not managed with exercise.  4. Return in about 4 weeks (around 4/10/2023) for DAYNA patel/ [place].      Sofia Paul, APRN  03/13/2023

## 2023-03-16 LAB
ABO GROUP BLD: NORMAL
AMPHETAMINES UR QL SCN: NEGATIVE NG/ML
APPEARANCE UR: CLEAR
BACTERIA #/AREA URNS HPF: NORMAL /[HPF]
BACTERIA UR CULT: NORMAL
BACTERIA UR CULT: NORMAL
BARBITURATES UR QL SCN: NEGATIVE NG/ML
BASOPHILS # BLD AUTO: 0 X10E3/UL (ref 0–0.2)
BASOPHILS NFR BLD AUTO: 1 %
BENZODIAZ UR QL SCN: NEGATIVE NG/ML
BILIRUB UR QL STRIP: NEGATIVE
BLD GP AB SCN SERPL QL: NEGATIVE
BZE UR QL SCN: NEGATIVE NG/ML
C TRACH RRNA SPEC QL NAA+PROBE: NEGATIVE
CANNABINOIDS UR QL SCN: NEGATIVE NG/ML
CASTS URNS QL MICRO: NORMAL /LPF
COLOR UR: YELLOW
CREAT UR-MCNC: 199 MG/DL (ref 20–300)
EOSINOPHIL # BLD AUTO: 0.3 X10E3/UL (ref 0–0.4)
EOSINOPHIL NFR BLD AUTO: 5 %
EPI CELLS #/AREA URNS HPF: NORMAL /HPF (ref 0–10)
ERYTHROCYTE [DISTWIDTH] IN BLOOD BY AUTOMATED COUNT: 11.9 % (ref 11.7–15.4)
GLUCOSE UR QL STRIP: NEGATIVE
HBV SURFACE AG SERPL QL IA: NEGATIVE
HCT VFR BLD AUTO: 36 % (ref 34–46.6)
HCV IGG SERPL QL IA: NON REACTIVE
HGB BLD-MCNC: 12.3 G/DL (ref 11.1–15.9)
HGB UR QL STRIP: NEGATIVE
HIV 1+2 AB+HIV1 P24 AG SERPL QL IA: NON REACTIVE
IMM GRANULOCYTES # BLD AUTO: 0 X10E3/UL (ref 0–0.1)
IMM GRANULOCYTES NFR BLD AUTO: 0 %
KETONES UR QL STRIP: NEGATIVE
LABORATORY COMMENT REPORT: NORMAL
LEUKOCYTE ESTERASE UR QL STRIP: NEGATIVE
LYMPHOCYTES # BLD AUTO: 2 X10E3/UL (ref 0.7–3.1)
LYMPHOCYTES NFR BLD AUTO: 32 %
MCH RBC QN AUTO: 30.3 PG (ref 26.6–33)
MCHC RBC AUTO-ENTMCNC: 34.2 G/DL (ref 31.5–35.7)
MCV RBC AUTO: 89 FL (ref 79–97)
METHADONE UR QL SCN: NEGATIVE NG/ML
MICRO URNS: NORMAL
MICRO URNS: NORMAL
MONOCYTES # BLD AUTO: 0.5 X10E3/UL (ref 0.1–0.9)
MONOCYTES NFR BLD AUTO: 8 %
N GONORRHOEA RRNA SPEC QL NAA+PROBE: NEGATIVE
NEUTROPHILS # BLD AUTO: 3.5 X10E3/UL (ref 1.4–7)
NEUTROPHILS NFR BLD AUTO: 54 %
NITRITE UR QL STRIP: NEGATIVE
OPIATES UR QL SCN: NEGATIVE NG/ML
OXYCODONE+OXYMORPHONE UR QL SCN: NEGATIVE NG/ML
PCP UR QL: NEGATIVE NG/ML
PH UR STRIP: 6.5 [PH] (ref 5–7.5)
PH UR: 6.4 [PH] (ref 4.5–8.9)
PLATELET # BLD AUTO: 233 X10E3/UL (ref 150–450)
PROPOXYPH UR QL SCN: NEGATIVE NG/ML
PROT UR QL STRIP: NEGATIVE
RBC # BLD AUTO: 4.06 X10E6/UL (ref 3.77–5.28)
RBC #/AREA URNS HPF: NORMAL /HPF (ref 0–2)
RH BLD: POSITIVE
RPR SER QL: NON REACTIVE
RUBV IGG SERPL IA-ACNC: 2.01 INDEX
SP GR UR STRIP: 1.03 (ref 1–1.03)
UROBILINOGEN UR STRIP-MCNC: 0.2 MG/DL (ref 0.2–1)
WBC # BLD AUTO: 6.4 X10E3/UL (ref 3.4–10.8)
WBC #/AREA URNS HPF: NORMAL /HPF (ref 0–5)

## 2023-04-10 ENCOUNTER — ROUTINE PRENATAL (OUTPATIENT)
Dept: OBSTETRICS AND GYNECOLOGY | Facility: CLINIC | Age: 23
End: 2023-04-10
Payer: COMMERCIAL

## 2023-04-10 VITALS — BODY MASS INDEX: 23.35 KG/M2 | DIASTOLIC BLOOD PRESSURE: 68 MMHG | SYSTOLIC BLOOD PRESSURE: 118 MMHG | WEIGHT: 153.6 LBS

## 2023-04-10 DIAGNOSIS — Z34.91 PRENATAL CARE IN FIRST TRIMESTER: Primary | ICD-10-CM

## 2023-04-10 DIAGNOSIS — O36.8390 UNABLE TO HEAR FETAL HEART TONES AS REASON FOR ULTRASOUND SCAN: ICD-10-CM

## 2023-04-10 LAB
EXPIRATION DATE: NORMAL
GLUCOSE UR STRIP-MCNC: NEGATIVE MG/DL
Lab: NORMAL
PROT UR STRIP-MCNC: NEGATIVE MG/DL

## 2023-04-10 NOTE — PROGRESS NOTES
OB FOLLOW UP  CC- Here for care of pregnancy        Kym Lo is a 22 y.o.  10w3d patient being seen today for her obstetrical follow up visit. Patient reports intermittent mild cramping and scant bleeding following intercourse x 1 week ago. She states the spotting was very light pink and resolved quickly.      Her prenatal care is complicated by (and status) : see below.  There is no problem list on file for this patient.      Desires genetic testing?: Does not wish to complete   Ultrasound Today: No    ROS -   Patient Reports : Cramping and spotting  Patient Denies: Loss of Fluid, Vision Changes, Headaches, Nausea  and Vomiting   Fetal Movement : absent- too early  All other systems reviewed and are negative.     The additional following portions of the patient's history were reviewed and updated as appropriate: allergies, current medications, past family history, past medical history, past social history, past surgical history and problem list.    I have reviewed and agree with the HPI, ROS, and historical information as entered above. Sofia Paul, APRN    /68   Wt 69.7 kg (153 lb 9.6 oz)   LMP 2023   BMI 23.35 kg/m²         EXAM:     Prenatal Vitals  BP: 118/68  Weight: 69.7 kg (153 lb 9.6 oz)   Fetal Heart Rate: 179        Urine Glucose Read-only: Negative  Urine Protein Read-only: Negative       Assessment and Plan    Problem List Items Addressed This Visit    None  Visit Diagnoses     Prenatal care in first trimester    -  Primary    Relevant Orders    POC Protein, Urine, Qualitative, Dipstick (Completed)    POC Glucose, Urine, Qualitative, Dipstick (Completed)    Unable to hear fetal heart tones as reason for ultrasound scan        Relevant Orders    US Ob Limited 1 + Fetuses          1. Pregnancy at 10w3d  2. Unable to doppler heart tones. US today showed , Placenta too early, AF Nml, CRL 34.4mm/10w2d/22%  3. Labs reviewed from New OB Visit.  4. Counseled on  genetic testing, carrier status and option for NT screen- Declined.   5. Activity and Exercise discussed.  6. Patient is on Prenatal vitamins   7. A+, light spotting post IC    Plan:  Return in about 4 weeks (around 5/8/2023) for DAYNA Barber.    Sofia Paul, APRN  04/10/2023

## 2023-04-24 ENCOUNTER — TELEPHONE (OUTPATIENT)
Dept: OBSTETRICS AND GYNECOLOGY | Facility: CLINIC | Age: 23
End: 2023-04-24
Payer: COMMERCIAL

## 2023-04-24 NOTE — TELEPHONE ENCOUNTER
Patient called and said she was going to the [facility] for a sinus infection , was wondering what she can take

## 2023-04-26 ENCOUNTER — TELEPHONE (OUTPATIENT)
Dept: OBSTETRICS AND GYNECOLOGY | Facility: CLINIC | Age: 23
End: 2023-04-26
Payer: COMMERCIAL

## 2023-04-26 NOTE — TELEPHONE ENCOUNTER
Pt has been sick and went to urgent care and was given amoxicillin.    Pt said she is still bad off even with the medication.     Pt is wanting to know if she should go back to urgent care or if she should try over the counter

## 2023-04-26 NOTE — TELEPHONE ENCOUNTER
Correction the Amox is 875. I spoke with TAZ KABA and she said she was being treated appropriately. She needs to rest, stay hydrated, tylenol for low grade temp, maybe stay home from work and go back to [facility] or PCP for SOB or continued fevers, temp over 100.5. Pt verbalizes understanding.

## 2023-04-26 NOTE — TELEPHONE ENCOUNTER
She is 12w 5days with low grade temp taking Amox 975 #20 now for two days and she feels worse. She has a long hx of nasal polyps and sinus infections. She went to [facility] like we suggested but felt like it was a bad experience. NKDA. Dr. Barber pt. She does not think she has nasal polyps but is not sure what to do. Her next apt is 5/9/2023

## 2023-05-09 ENCOUNTER — ROUTINE PRENATAL (OUTPATIENT)
Dept: OBSTETRICS AND GYNECOLOGY | Facility: CLINIC | Age: 23
End: 2023-05-09
Payer: COMMERCIAL

## 2023-05-09 VITALS — SYSTOLIC BLOOD PRESSURE: 110 MMHG | WEIGHT: 158.2 LBS | DIASTOLIC BLOOD PRESSURE: 70 MMHG | BODY MASS INDEX: 24.05 KG/M2

## 2023-05-09 DIAGNOSIS — O36.8390 ULTRASOUND SCAN DONE FOR INABILITY TO HEAR FETAL HEART TONES: ICD-10-CM

## 2023-05-09 DIAGNOSIS — F41.9 ANXIETY: ICD-10-CM

## 2023-05-09 DIAGNOSIS — R19.7 DIARRHEA, UNSPECIFIED TYPE: ICD-10-CM

## 2023-05-09 DIAGNOSIS — R09.81 MILD NASAL CONGESTION: ICD-10-CM

## 2023-05-09 DIAGNOSIS — Z34.80 SUPERVISION OF OTHER NORMAL PREGNANCY, ANTEPARTUM: Primary | ICD-10-CM

## 2023-05-09 DIAGNOSIS — Z36.89 ENCOUNTER FOR FETAL ANATOMIC SURVEY: ICD-10-CM

## 2023-05-09 LAB
GLUCOSE UR STRIP-MCNC: NEGATIVE MG/DL
PROT UR STRIP-MCNC: NEGATIVE MG/DL

## 2023-05-09 NOTE — PROGRESS NOTES
,        OB FOLLOW UP  CC- Here for care of pregnancy        Kym Lo is a 22 y.o.  14w4d patient being seen today for her obstetrical follow up visit. Patient reports occasional cramping.  She completed amoxicillin for suspected sinus infection.  She has a h/o nasal polyps and continues to have congestion.  Declines cfdna at this time.     Her prenatal care is complicated by (and status) :   Patient Active Problem List   Diagnosis   • Ultrasound scan done for inability to hear fetal heart tones   • Anxiety   • Diarrhea   • Mild nasal congestion     Ultrasound Today: Yes, difficulty doppler fetal heart tones stat US +fht (anterior placenta)    ROS -     Patient Denies: Loss of Fluid, Vaginal Spotting, Vision Changes, Headaches, Nausea  and Vomiting   All other systems reviewed and are negative.     The additional following portions of the patient's history were reviewed and updated as appropriate: allergies, current medications, past family history, past medical history, past social history, past surgical history and problem list.    I have reviewed and agree with the HPI, ROS, and historical information as entered above. Mitzi Rondon, APRN        EXAM:     Prenatal Vitals  BP: 110/70  Weight: 71.8 kg (158 lb 3.2 oz)   Fetal Heart Rate: us   Pelvic Exam:        Urine Glucose Read-only: Negative  Urine Protein Read-only: Negative           Assessment and Plan    Problem List Items Addressed This Visit        ENT    Mild nasal congestion    Overview     Flonase              Gastrointestinal Abdominal     Diarrhea    Overview     Has been occuring intermittently since beginning of pregnancy. Immodium daily PRN, Probiotic daily.            Gravid and     Ultrasound scan done for inability to hear fetal heart tones    Overview     Anterior placenta            Mental Health    Anxiety   Other Visit Diagnoses     Supervision of other normal pregnancy, antepartum    -  Primary    Relevant Orders     POC Urinalysis Dipstick (Completed)    US Ob Limited 1 + Fetuses    Encounter for fetal anatomic survey        Relevant Orders    US Ob 14 + Weeks Single or First Gestation          1. Pregnancy at 14w4d. Patient states fetus is male r/t Baby Belly test.  2. Fetal status reassuring. Ultrasound Today: Yes, difficulty doppler fetal heart tones stat US +fht (anterior placenta)  3. Anatomy scan next visit.   4. Activity and Exercise discussed.  5. Mild nasal congestion using Flonase.  6. Patient is on Prenatal vitamins  7. Diarrhea-immodium PRN, Daily probiotic ok to consider as well.    8. Follow up in four weeks.    Mitzi Rondon, APRN  05/09/2023

## 2023-06-12 ENCOUNTER — ROUTINE PRENATAL (OUTPATIENT)
Dept: OBSTETRICS AND GYNECOLOGY | Facility: CLINIC | Age: 23
End: 2023-06-12
Payer: COMMERCIAL

## 2023-06-12 VITALS — WEIGHT: 159.8 LBS | BODY MASS INDEX: 24.3 KG/M2 | SYSTOLIC BLOOD PRESSURE: 120 MMHG | DIASTOLIC BLOOD PRESSURE: 74 MMHG

## 2023-06-12 DIAGNOSIS — Z3A.19 19 WEEKS GESTATION OF PREGNANCY: ICD-10-CM

## 2023-06-12 DIAGNOSIS — Z34.92 PRENATAL CARE IN SECOND TRIMESTER: Primary | ICD-10-CM

## 2023-06-12 PROBLEM — R09.81 MILD NASAL CONGESTION: Status: RESOLVED | Noted: 2023-05-09 | Resolved: 2023-06-12

## 2023-06-12 LAB
GLUCOSE UR STRIP-MCNC: NEGATIVE MG/DL
PROT UR STRIP-MCNC: NEGATIVE MG/DL

## 2023-06-12 PROCEDURE — 0502F SUBSEQUENT PRENATAL CARE: CPT | Performed by: OBSTETRICS & GYNECOLOGY

## 2023-06-12 NOTE — PROGRESS NOTES
OB FOLLOW UP  CC- Here for care of pregnancy        Kym Lo is a 22 y.o.  19w3d patient being seen today for her obstetrical follow up visit. Patient reports headaches that are usually relieved with peppermint oil or rest.     Her prenatal care is complicated by (and status) : None  Patient Active Problem List   Diagnosis    Anxiety    Pregnancy       Ultrasound Today: Yes    ROS -   Patient Reports : Headaches  Patient Denies: Loss of Fluid, Vaginal Spotting, Vision Changes, Nausea , and Vomiting   Fetal Movement : normal  All other systems reviewed and are negative.       The additional following portions of the patient's history were reviewed and updated as appropriate: allergies, current medications, past family history, past medical history, past social history, past surgical history, and problem list.      I have reviewed and agree with the HPI, ROS, and historical information as entered above. Jenifer Barber MD      /74   Wt 72.5 kg (159 lb 12.8 oz)   LMP 2023   BMI 24.30 kg/m²       EXAM:     Prenatal Vitals  BP: 120/74  Weight: 72.5 kg (159 lb 12.8 oz)   Fetal Heart Rate: +          Urine Glucose Read-only: Negative  Urine Protein Read-only: Negative       Assessment and Plan    Problem List Items Addressed This Visit       Pregnancy     Other Visit Diagnoses       Prenatal care in second trimester    -  Primary    Relevant Orders    US Ob 14 + Weeks Single or First Gestation (Completed)    POC Urinalysis Dipstick (Completed)    US Ob Follow Up Transabdominal Approach            Pregnancy at 19w3d  Anatomy scan today is incomplete, follow up in 4 weeks for additional views. Anatomy that was visualized was within normal limits.  Fetal status reassuring.   Activity and Exercise discussed.  Patient is on Prenatal vitamins  Return in about 1 month (around 2023) for F/U Prenatal, U/S Next Visit.    Jenifer Barber MD  2023

## 2023-08-10 ENCOUNTER — ROUTINE PRENATAL (OUTPATIENT)
Dept: OBSTETRICS AND GYNECOLOGY | Facility: CLINIC | Age: 23
End: 2023-08-10
Payer: COMMERCIAL

## 2023-08-10 VITALS — SYSTOLIC BLOOD PRESSURE: 120 MMHG | DIASTOLIC BLOOD PRESSURE: 70 MMHG | BODY MASS INDEX: 26.46 KG/M2 | WEIGHT: 174 LBS

## 2023-08-10 DIAGNOSIS — Z3A.27 27 WEEKS GESTATION OF PREGNANCY: ICD-10-CM

## 2023-08-10 DIAGNOSIS — Z34.93 PRENATAL CARE IN THIRD TRIMESTER: Primary | ICD-10-CM

## 2023-08-10 DIAGNOSIS — O26.849 UTERINE SIZE DATE DISCREPANCY PREGNANCY: ICD-10-CM

## 2023-08-10 LAB
GLUCOSE UR STRIP-MCNC: NEGATIVE MG/DL
PROT UR STRIP-MCNC: NEGATIVE MG/DL

## 2023-08-10 NOTE — PROGRESS NOTES
OB FOLLOW UP  CC- Here for care of pregnancy        Kym Lo is a 22 y.o.  27w6d patient being seen today for her obstetrical follow up. Patient reports heartburn relieved by tums. She reports headaches at nighttime, denies vision changes. She states using an ice pack or heating pack on her neck helps her headaches. She reports intermittent roverto kothari contractions in the shower. She reports trace swelling in her toes and fingers when the weather is hot.     Patient undergoing Glucola testing today. She is due for her testing at 1025.       MBT: A+  Rhogam:  not indicated  28 week packet: reviewed with patient , counseled on fetal movement , pediatrician list reviewed, breast pump discussed, and childbirth classes reviewed  TDAP: given today  Ultrasound Today: No    Her prenatal care is complicated by (and status) :    Patient Active Problem List   Diagnosis    Anxiety    Pregnancy         ROS -   Patient Denies: Loss of Fluid, Vaginal Spotting, Vision Changes, Nausea , Vomiting , Contractions, and Epigastric pain  Fetal Movement : normal    The additional following portions of the patient's history were reviewed and updated as appropriate: allergies, current medications, past family history, past medical history, past social history, past surgical history, and problem list.    I have reviewed and agree with the HPI, ROS, and historical information as entered above. Jenifer Barber MD      /70   Wt 78.9 kg (174 lb)   LMP 2023   BMI 26.46 kg/mý         EXAM:     Prenatal Vitals  BP: 120/70  Weight: 78.9 kg (174 lb)   Fetal Heart Rate: +      Fundal Height (cm): 27 cm        Urine Glucose Read-only: Negative  Urine Protein Read-only: Negative         Assessment and Plan    Problem List Items Addressed This Visit       Pregnancy     Other Visit Diagnoses       Prenatal care in third trimester    -  Primary    Relevant Orders    CBC (No Diff)    Gestational Screen 1 Hr ([facility])     Antibody Screen    Tdap Vaccine Greater Than or Equal To 8yo IM (Completed)    POC Urinalysis Dipstick (Completed)    Uterine size date discrepancy pregnancy        Relevant Orders    US Ob Follow Up Transabdominal Approach            Pregnancy at 27w6d  1 hr Glucola, CBC, and antibody screen today  and TDAP given today  Fetal movement/PTL or Labor precautions  U/S ordered at follow up  Activity and Exercise discussed.  Return in about 1 month (around 9/10/2023) for F/U Prenatal, U/S Next Visit.    Jenifer Barber MD  08/10/2023

## 2023-08-11 ENCOUNTER — TELEPHONE (OUTPATIENT)
Dept: OBSTETRICS AND GYNECOLOGY | Facility: CLINIC | Age: 23
End: 2023-08-11
Payer: COMMERCIAL

## 2023-08-11 DIAGNOSIS — O99.019 IRON DEFICIENCY ANEMIA DURING PREGNANCY: Primary | ICD-10-CM

## 2023-08-11 DIAGNOSIS — D50.9 IRON DEFICIENCY ANEMIA DURING PREGNANCY: Primary | ICD-10-CM

## 2023-08-11 LAB
BLD GP AB SCN SERPL QL: NEGATIVE
ERYTHROCYTE [DISTWIDTH] IN BLOOD BY AUTOMATED COUNT: 11.7 % (ref 12.3–15.4)
GLUCOSE 1H P 50 G GLC PO SERPL-MCNC: 130 MG/DL (ref 65–139)
HCT VFR BLD AUTO: 33.2 % (ref 34–46.6)
HGB BLD-MCNC: 11.4 G/DL (ref 12–15.9)
MCH RBC QN AUTO: 31.1 PG (ref 26.6–33)
MCHC RBC AUTO-ENTMCNC: 34.3 G/DL (ref 31.5–35.7)
MCV RBC AUTO: 90.5 FL (ref 79–97)
PLATELET # BLD AUTO: 215 10*3/MM3 (ref 140–450)
RBC # BLD AUTO: 3.67 10*6/MM3 (ref 3.77–5.28)
WBC # BLD AUTO: 8.85 10*3/MM3 (ref 3.4–10.8)

## 2023-08-11 RX ORDER — FERROUS SULFATE 325(65) MG
325 TABLET ORAL
Qty: 30 TABLET | Refills: 2 | Status: SHIPPED | OUTPATIENT
Start: 2023-08-11

## 2023-11-01 NOTE — TELEPHONE ENCOUNTER
Lab results reviewed with MARK KABA. Patient notified of negative Covid-19 test result, VU. States they were tested due to exposure and have had no symptoms.     on unit